# Patient Record
Sex: FEMALE | URBAN - METROPOLITAN AREA
[De-identification: names, ages, dates, MRNs, and addresses within clinical notes are randomized per-mention and may not be internally consistent; named-entity substitution may affect disease eponyms.]

---

## 2022-07-09 ENCOUNTER — TELEPHONE ENCOUNTER (OUTPATIENT)
Dept: URBAN - METROPOLITAN AREA CLINIC 121 | Facility: CLINIC | Age: 52
End: 2022-07-09

## 2022-07-10 ENCOUNTER — TELEPHONE ENCOUNTER (OUTPATIENT)
Dept: URBAN - METROPOLITAN AREA CLINIC 121 | Facility: CLINIC | Age: 52
End: 2022-07-10

## 2022-07-10 RX ORDER — INSULIN LISPRO 100 [IU]/ML
INJECTION, SOLUTION INTRAVENOUS; SUBCUTANEOUS TAKE AS DIRECTED
Refills: 0 | Status: ACTIVE | COMMUNITY
Start: 2018-02-22

## 2022-07-10 RX ORDER — MONTELUKAST 10 MG/1
TABLET, FILM COATED ORAL ONCE A DAY
Refills: 0 | Status: ACTIVE | COMMUNITY
Start: 2018-02-14

## 2022-07-10 RX ORDER — MOMETASONE FUROATE AND FORMOTEROL FUMARATE DIHYDRATE 100; 5 UG/1; UG/1
AEROSOL RESPIRATORY (INHALATION) TAKE AS DIRECTED
Refills: 0 | Status: ACTIVE | COMMUNITY
Start: 2018-02-22

## 2022-07-10 RX ORDER — FLUCONAZOLE 150 MG/1
TABLET ORAL ONCE A DAY
Refills: 0 | Status: ACTIVE | COMMUNITY
Start: 2018-01-24

## 2022-07-10 RX ORDER — ALBUTEROL SULFATE 1.25 MG/3ML
SOLUTION RESPIRATORY (INHALATION) TAKE AS DIRECTED
Refills: 0 | Status: ACTIVE | COMMUNITY
Start: 2018-02-22

## 2022-07-10 RX ORDER — METFORMIN HCL 500 MG/1
TABLET ORAL TWICE A DAY
Refills: 0 | Status: ACTIVE | COMMUNITY
Start: 2018-02-22

## 2022-07-10 RX ORDER — RABEPRAZOLE SODIUM 20 MG/1
TABLET, DELAYED RELEASE ORAL ONCE A DAY
Refills: 0 | Status: ACTIVE | COMMUNITY
Start: 2018-02-22

## 2024-02-28 ENCOUNTER — OV NP (OUTPATIENT)
Dept: URBAN - METROPOLITAN AREA CLINIC 9 | Facility: CLINIC | Age: 54
End: 2024-02-28

## 2024-03-22 ENCOUNTER — OV NP (OUTPATIENT)
Dept: URBAN - METROPOLITAN AREA CLINIC 9 | Facility: CLINIC | Age: 54
End: 2024-03-22

## 2024-04-10 ENCOUNTER — OV NP (OUTPATIENT)
Dept: URBAN - METROPOLITAN AREA CLINIC 9 | Facility: CLINIC | Age: 54
End: 2024-04-10
Payer: COMMERCIAL

## 2024-04-10 VITALS
HEIGHT: 63 IN | WEIGHT: 227 LBS | BODY MASS INDEX: 40.22 KG/M2 | DIASTOLIC BLOOD PRESSURE: 88 MMHG | SYSTOLIC BLOOD PRESSURE: 141 MMHG

## 2024-04-10 DIAGNOSIS — R68.81 EARLY SATIETY: ICD-10-CM

## 2024-04-10 DIAGNOSIS — Z12.11 COLON CANCER SCREENING: ICD-10-CM

## 2024-04-10 DIAGNOSIS — R10.13 EPIGASTRIC PAIN: ICD-10-CM

## 2024-04-10 DIAGNOSIS — K21.9 GASTROESOPHAGEAL REFLUX DISEASE, UNSPECIFIED WHETHER ESOPHAGITIS PRESENT: ICD-10-CM

## 2024-04-10 DIAGNOSIS — Z80.0 FAMILY HISTORY OF COLON CANCER: ICD-10-CM

## 2024-04-10 PROCEDURE — 99204 OFFICE O/P NEW MOD 45 MIN: CPT | Performed by: STUDENT IN AN ORGANIZED HEALTH CARE EDUCATION/TRAINING PROGRAM

## 2024-04-10 RX ORDER — METFORMIN HCL 500 MG/1
TABLET ORAL TWICE A DAY
Refills: 0 | Status: DISCONTINUED | COMMUNITY
Start: 2018-02-22

## 2024-04-10 RX ORDER — EMPAGLIFLOZIN 25 MG/1
1 TABLET TABLET, FILM COATED ORAL ONCE A DAY
Status: ACTIVE | COMMUNITY

## 2024-04-10 RX ORDER — RABEPRAZOLE SODIUM 20 MG/1
TABLET, DELAYED RELEASE ORAL ONCE A DAY
Refills: 0 | Status: DISCONTINUED | COMMUNITY
Start: 2018-02-22

## 2024-04-10 RX ORDER — DICLOFENAC SODIUM 50 MG/1
1 TABLET AS NEEDED TABLET, DELAYED RELEASE ORAL TWICE A DAY
Status: ACTIVE | COMMUNITY

## 2024-04-10 RX ORDER — SACUBITRIL AND VALSARTAN 97; 103 MG/1; MG/1
1 TABLET TABLET, FILM COATED ORAL TWICE A DAY
Status: ACTIVE | COMMUNITY

## 2024-04-10 RX ORDER — ALBUTEROL SULFATE 1.25 MG/3ML
SOLUTION RESPIRATORY (INHALATION) TAKE AS DIRECTED
Refills: 0 | Status: ACTIVE | COMMUNITY
Start: 2018-02-22

## 2024-04-10 RX ORDER — MONTELUKAST 10 MG/1
TABLET, FILM COATED ORAL ONCE A DAY
Refills: 0 | Status: DISCONTINUED | COMMUNITY
Start: 2018-02-14

## 2024-04-10 RX ORDER — INSULIN LISPRO 100 [IU]/ML
INJECTION, SOLUTION INTRAVENOUS; SUBCUTANEOUS TAKE AS DIRECTED
Refills: 0 | Status: DISCONTINUED | COMMUNITY
Start: 2018-02-22

## 2024-04-10 RX ORDER — LORAZEPAM 0.5 MG/1
1 TABLET AT BEDTIME AS NEEDED TABLET ORAL ONCE A DAY
Status: ACTIVE | COMMUNITY

## 2024-04-10 RX ORDER — CARVEDILOL 3.12 MG/1
1 TABLET WITH FOOD TABLET, FILM COATED ORAL TWICE A DAY
Status: ACTIVE | COMMUNITY

## 2024-04-10 RX ORDER — MOMETASONE FUROATE AND FORMOTEROL FUMARATE DIHYDRATE 100; 5 UG/1; UG/1
AEROSOL RESPIRATORY (INHALATION) TAKE AS DIRECTED
Refills: 0 | Status: DISCONTINUED | COMMUNITY
Start: 2018-02-22

## 2024-04-10 RX ORDER — FLUCONAZOLE 150 MG/1
1 TABLET TABLET ORAL AS NEEDED
Refills: 0 | Status: DISCONTINUED | COMMUNITY
Start: 2018-01-24

## 2024-04-10 RX ORDER — OMEPRAZOLE 40 MG/1
1 CAPSULE 30 MINUTES BEFORE MORNING MEAL CAPSULE, DELAYED RELEASE ORAL ONCE A DAY
Status: ACTIVE | COMMUNITY

## 2024-04-10 RX ORDER — TIRZEPATIDE 7.5 MG/.5ML
AS DIRECTED INJECTION, SOLUTION SUBCUTANEOUS
Status: ACTIVE | COMMUNITY

## 2024-04-10 RX ORDER — PRAVASTATIN SODIUM 20 MG/1
1 TABLET TABLET ORAL ONCE A DAY
Status: ACTIVE | COMMUNITY

## 2024-04-10 NOTE — HPI-TODAY'S VISIT:
Patient has a history of GERD, DM, CAD s/p stent in 2018, CHF,  who presents for EGD/Colon, stomach issues  GI Hx: 4/10/24- Feels very distended, epigastric pain. Intermittent lower abdominal pain. +Regurgitation. GERD on omeprazole 40mg daily. Last echo within the past 1 year. Seen by Cardiology as well. +Early satiety.  ROS includes and is negative for the below, unless specified positive above: Denies weight loss, fever, chills, abdominal pain, nausea, vomiting, diarrhea.  Denies dysphagia, reflux, UGI symptoms. Denies hematemesis, melena, hematochezia, blood per rectum.  Family hx:  Mother- hiatal hernia +Family hx of colon cancer  EGD: None  Colonoscopy: No recent  Imaging/Studies/Procedures: 11/2023- CMP, A1c 8.4 (H), CBC, TSH normal.

## 2024-05-07 ENCOUNTER — CLAIMS CREATED FROM THE CLAIM WINDOW (OUTPATIENT)
Dept: URBAN - METROPOLITAN AREA SURGERY CENTER 9 | Facility: SURGERY CENTER | Age: 54
End: 2024-05-07
Payer: COMMERCIAL

## 2024-05-07 ENCOUNTER — CLAIMS CREATED FROM THE CLAIM WINDOW (OUTPATIENT)
Dept: URBAN - METROPOLITAN AREA CLINIC 4 | Facility: CLINIC | Age: 54
End: 2024-05-07
Payer: COMMERCIAL

## 2024-05-07 DIAGNOSIS — K31.89 OTHER DISEASES OF STOMACH AND DUODENUM: ICD-10-CM

## 2024-05-07 DIAGNOSIS — K63.5 POLYP OF ASCENDING COLON, UNSPECIFIED TYPE: ICD-10-CM

## 2024-05-07 DIAGNOSIS — K44.9 HIATAL HERNIA: ICD-10-CM

## 2024-05-07 DIAGNOSIS — K64.8 OTHER HEMORRHOIDS: ICD-10-CM

## 2024-05-07 DIAGNOSIS — K29.70 GASTRITIS, UNSPECIFIED, WITHOUT BLEEDING: ICD-10-CM

## 2024-05-07 DIAGNOSIS — K29.70 GASTRITIS WITHOUT BLEEDING, UNSPECIFIED CHRONICITY, UNSPECIFIED GASTRITIS TYPE: ICD-10-CM

## 2024-05-07 DIAGNOSIS — Z12.11 ENCOUNTER FOR SCREENING FOR MALIGNANT NEOPLASM OF COLON: ICD-10-CM

## 2024-05-07 DIAGNOSIS — K44.9 DIAPHRAGMATIC HERNIA WITHOUT OBSTRUCTION OR GANGRENE: ICD-10-CM

## 2024-05-07 DIAGNOSIS — K62.1 RECTAL POLYP: ICD-10-CM

## 2024-05-07 DIAGNOSIS — K31.89 REACTIVE GASTROPATHY: ICD-10-CM

## 2024-05-07 DIAGNOSIS — Z80.0 FAMILY HISTORY OF MALIGNANT NEOPLASM OF DIGESTIVE ORGANS: ICD-10-CM

## 2024-05-07 DIAGNOSIS — Z12.11 ENCOUNTER SCREENING FOR MALIGNANT NEOPLASM OF COLON: ICD-10-CM

## 2024-05-07 DIAGNOSIS — D12.0 ADENOMA OF CECUM: ICD-10-CM

## 2024-05-07 DIAGNOSIS — K22.89 OTHER SPECIFIED DISEASE OF ESOPHAGUS: ICD-10-CM

## 2024-05-07 DIAGNOSIS — Z80.0 FAMILY HISTORY OF COLON CANCER: ICD-10-CM

## 2024-05-07 DIAGNOSIS — K64.0 FIRST DEGREE HEMORRHOIDS: ICD-10-CM

## 2024-05-07 DIAGNOSIS — D12.0 BENIGN NEOPLASM OF CECUM: ICD-10-CM

## 2024-05-07 DIAGNOSIS — K63.89 HYPERPLASTIC POLYP OF INTESTINE: ICD-10-CM

## 2024-05-07 PROCEDURE — 45385 COLONOSCOPY W/LESION REMOVAL: CPT | Performed by: STUDENT IN AN ORGANIZED HEALTH CARE EDUCATION/TRAINING PROGRAM

## 2024-05-07 PROCEDURE — 88305 TISSUE EXAM BY PATHOLOGIST: CPT | Performed by: PATHOLOGY

## 2024-05-07 PROCEDURE — 88312 SPECIAL STAINS GROUP 1: CPT | Performed by: PATHOLOGY

## 2024-05-07 PROCEDURE — 43239 EGD BIOPSY SINGLE/MULTIPLE: CPT | Performed by: STUDENT IN AN ORGANIZED HEALTH CARE EDUCATION/TRAINING PROGRAM

## 2024-05-07 PROCEDURE — 00813 ANES UPR LWR GI NDSC PX: CPT | Performed by: NURSE ANESTHETIST, CERTIFIED REGISTERED

## 2024-05-07 RX ORDER — OMEPRAZOLE 40 MG/1
1 CAPSULE 30 MINUTES BEFORE MORNING MEAL CAPSULE, DELAYED RELEASE ORAL ONCE A DAY
Status: ACTIVE | COMMUNITY

## 2024-05-07 RX ORDER — PRAVASTATIN SODIUM 20 MG/1
1 TABLET TABLET ORAL ONCE A DAY
Status: ACTIVE | COMMUNITY

## 2024-05-07 RX ORDER — CARVEDILOL 3.12 MG/1
1 TABLET WITH FOOD TABLET, FILM COATED ORAL TWICE A DAY
Status: ACTIVE | COMMUNITY

## 2024-05-07 RX ORDER — ALBUTEROL SULFATE 1.25 MG/3ML
SOLUTION RESPIRATORY (INHALATION) TAKE AS DIRECTED
Refills: 0 | Status: ACTIVE | COMMUNITY
Start: 2018-02-22

## 2024-05-07 RX ORDER — LORAZEPAM 0.5 MG/1
1 TABLET AT BEDTIME AS NEEDED TABLET ORAL ONCE A DAY
Status: ACTIVE | COMMUNITY

## 2024-05-07 RX ORDER — DICLOFENAC SODIUM 50 MG/1
1 TABLET AS NEEDED TABLET, DELAYED RELEASE ORAL TWICE A DAY
Status: ACTIVE | COMMUNITY

## 2024-05-07 RX ORDER — INSULIN ASPART 100 [IU]/ML
AS DIRECTED INJECTION, SOLUTION INTRAVENOUS; SUBCUTANEOUS
Status: ACTIVE | COMMUNITY

## 2024-05-07 RX ORDER — EMPAGLIFLOZIN 25 MG/1
1 TABLET TABLET, FILM COATED ORAL ONCE A DAY
Status: ACTIVE | COMMUNITY

## 2024-05-07 RX ORDER — TIRZEPATIDE 7.5 MG/.5ML
AS DIRECTED INJECTION, SOLUTION SUBCUTANEOUS
Status: ACTIVE | COMMUNITY

## 2024-05-07 RX ORDER — SACUBITRIL AND VALSARTAN 97; 103 MG/1; MG/1
1 TABLET TABLET, FILM COATED ORAL TWICE A DAY
Status: ACTIVE | COMMUNITY